# Patient Record
Sex: FEMALE | Race: WHITE | NOT HISPANIC OR LATINO | Employment: OTHER | URBAN - NONMETROPOLITAN AREA
[De-identification: names, ages, dates, MRNs, and addresses within clinical notes are randomized per-mention and may not be internally consistent; named-entity substitution may affect disease eponyms.]

---

## 2019-06-11 ENCOUNTER — HISTORICAL (OUTPATIENT)
Dept: ADMINISTRATIVE | Facility: HOSPITAL | Age: 44
End: 2019-06-11

## 2020-01-08 LAB
BILIRUB SERPL-MCNC: NEGATIVE MG/DL
BLOOD URINE, POC: NEGATIVE
CLARITY, POC UA: NORMAL
COLOR, POC UA: NORMAL
GLUCOSE UR QL STRIP: NEGATIVE
KETONES UR QL STRIP: NEGATIVE
LEUKOCYTE EST, POC UA: NORMAL
NITRITE, POC UA: POSITIVE
PH, POC UA: 5.5
PROTEIN, POC: NEGATIVE
SPECIFIC GRAVITY, POC UA: NORMAL
UROBILINOGEN, POC UA: NORMAL

## 2020-02-14 ENCOUNTER — HISTORICAL (OUTPATIENT)
Dept: ADMINISTRATIVE | Facility: HOSPITAL | Age: 45
End: 2020-02-14

## 2020-02-15 ENCOUNTER — HISTORICAL (OUTPATIENT)
Dept: ADMINISTRATIVE | Facility: HOSPITAL | Age: 45
End: 2020-02-15

## 2020-03-04 ENCOUNTER — HISTORICAL (OUTPATIENT)
Dept: ADMINISTRATIVE | Facility: HOSPITAL | Age: 45
End: 2020-03-04

## 2020-11-19 LAB
BILIRUB SERPL-MCNC: NEGATIVE MG/DL
BLOOD URINE, POC: NEGATIVE
CLARITY, POC UA: NORMAL
COLOR, POC UA: YELLOW
GLUCOSE UR QL STRIP: NEGATIVE
KETONES UR QL STRIP: NEGATIVE
LEUKOCYTE EST, POC UA: NORMAL
NITRITE, POC UA: NEGATIVE
PH, POC UA: 7
PROTEIN, POC: NEGATIVE
SPECIFIC GRAVITY, POC UA: 1.02
UROBILINOGEN, POC UA: NORMAL

## 2021-04-28 LAB
BILIRUB SERPL-MCNC: NEGATIVE MG/DL
BLOOD URINE, POC: NEGATIVE
CLARITY, POC UA: CLEAR
COLOR, POC UA: YELLOW
GLUCOSE UR QL STRIP: NEGATIVE
KETONES UR QL STRIP: NEGATIVE
LEUKOCYTE EST, POC UA: NORMAL
NITRITE, POC UA: NEGATIVE
PH, POC UA: 6.5
PROTEIN, POC: NORMAL
SPECIFIC GRAVITY, POC UA: NORMAL
UROBILINOGEN, POC UA: NORMAL

## 2022-04-11 ENCOUNTER — HISTORICAL (OUTPATIENT)
Dept: ADMINISTRATIVE | Facility: HOSPITAL | Age: 47
End: 2022-04-11
Payer: MEDICAID

## 2022-04-24 VITALS
SYSTOLIC BLOOD PRESSURE: 91 MMHG | WEIGHT: 99.19 LBS | HEIGHT: 63 IN | BODY MASS INDEX: 17.57 KG/M2 | DIASTOLIC BLOOD PRESSURE: 66 MMHG

## 2022-05-13 ENCOUNTER — HISTORICAL (OUTPATIENT)
Dept: ADMINISTRATIVE | Facility: HOSPITAL | Age: 47
End: 2022-05-13

## 2022-05-25 ENCOUNTER — HISTORICAL (OUTPATIENT)
Dept: ADMINISTRATIVE | Facility: HOSPITAL | Age: 47
End: 2022-05-25

## 2022-06-04 ENCOUNTER — HISTORICAL (OUTPATIENT)
Dept: ADMINISTRATIVE | Facility: HOSPITAL | Age: 47
End: 2022-06-04

## 2022-06-05 ENCOUNTER — HISTORICAL (OUTPATIENT)
Dept: ADMINISTRATIVE | Facility: HOSPITAL | Age: 47
End: 2022-06-05

## 2022-06-06 ENCOUNTER — HISTORICAL (OUTPATIENT)
Dept: ADMINISTRATIVE | Facility: HOSPITAL | Age: 47
End: 2022-06-06

## 2022-06-08 ENCOUNTER — HISTORICAL (OUTPATIENT)
Dept: ADMINISTRATIVE | Facility: HOSPITAL | Age: 47
End: 2022-06-08

## 2022-06-10 ENCOUNTER — EXTERNAL HOSPITAL ADMISSION (OUTPATIENT)
Dept: ADMINISTRATIVE | Facility: CLINIC | Age: 47
End: 2022-06-10
Payer: MEDICAID

## 2022-06-10 ENCOUNTER — PATIENT OUTREACH (OUTPATIENT)
Dept: ADMINISTRATIVE | Facility: CLINIC | Age: 47
End: 2022-06-10
Payer: MEDICAID

## 2022-06-10 NOTE — PROGRESS NOTES
C3 nurse attempted to contact Tia Avalos   for a TCC post hospital discharge follow up call. No answer. Left voicemail with callback information. The patient does not have a scheduled HOSFU appointment. Non [facility] PCP.

## 2022-09-21 ENCOUNTER — HISTORICAL (OUTPATIENT)
Dept: ADMINISTRATIVE | Facility: HOSPITAL | Age: 47
End: 2022-09-21
Payer: MEDICARE

## 2023-01-30 ENCOUNTER — TELEPHONE (OUTPATIENT)
Dept: OBSTETRICS AND GYNECOLOGY | Facility: CLINIC | Age: 48
End: 2023-01-30
Payer: MEDICARE

## 2023-01-30 DIAGNOSIS — N39.0 URINARY TRACT INFECTION WITHOUT HEMATURIA, SITE UNSPECIFIED: Primary | ICD-10-CM

## 2023-01-30 RX ORDER — NITROFURANTOIN 25; 75 MG/1; MG/1
100 CAPSULE ORAL 2 TIMES DAILY
Qty: 14 CAPSULE | Refills: 0 | Status: SHIPPED | OUTPATIENT
Start: 2023-01-30 | End: 2023-02-06

## 2023-03-14 ENCOUNTER — HOSPITAL ENCOUNTER (OUTPATIENT)
Dept: RADIOLOGY | Facility: HOSPITAL | Age: 48
Discharge: HOME | End: 2023-03-14
Attending: NURSE PRACTITIONER
Payer: MEDICARE

## 2023-03-14 VITALS — HEIGHT: 63 IN | WEIGHT: 100 LBS | BODY MASS INDEX: 17.72 KG/M2

## 2023-03-14 DIAGNOSIS — Z12.31 BREAST CANCER SCREENING BY MAMMOGRAM: ICD-10-CM

## 2023-03-14 DIAGNOSIS — F17.200 TOBACCO USE DISORDER: ICD-10-CM

## 2023-03-14 PROCEDURE — 77067 SCR MAMMO BI INCL CAD: CPT | Mod: TC

## 2023-03-14 PROCEDURE — 71046 X-RAY EXAM CHEST 2 VIEWS: CPT | Mod: TC

## 2023-03-16 ENCOUNTER — TELEPHONE (OUTPATIENT)
Dept: OBSTETRICS AND GYNECOLOGY | Facility: CLINIC | Age: 48
End: 2023-03-16
Payer: MEDICARE

## 2023-03-16 DIAGNOSIS — N39.41 URGE INCONTINENCE: Primary | ICD-10-CM

## 2023-03-16 RX ORDER — SOLIFENACIN SUCCINATE 10 MG/1
10 TABLET, FILM COATED ORAL DAILY
Qty: 30 TABLET | Refills: 11 | Status: SHIPPED | OUTPATIENT
Start: 2023-03-16 | End: 2023-08-02 | Stop reason: SDUPTHER

## 2023-03-16 NOTE — TELEPHONE ENCOUNTER
"----- Message from Kelly Bull sent at 3/15/2023  2:56 PM CDT -----  Regarding: Refill  Type:  RX Refill Request    Who Called: Mackenzie Amezcua (Care giver)  Refill or New Rx:Refill  RX Name and Strength:"Tovi" for bladder  How is the patient currently taking it? (ex. 1XDay):  Is this a 30 day or 90 day RX:  Preferred Pharmacy with phone number:[facility] in [place]  Local or Mail Order:  Ordering Provider:ASHLEY  Would the patient rather a call back or a response via [facility]?   Best Call Back Number:[phone]  Additional Information: States she needs a PA.        "

## 2023-03-16 NOTE — TELEPHONE ENCOUNTER
Contacted pt's caregiver, Mackenzie, and notifeid ehr Mrs. Ha would like her to try Vesicare 10mg. Pts caregiver states pt preferred pharmacy is [facility] in [place]. Notified her if pt has any problems with medication to give office a call and that medication was being sent in at this moment. Pts caregiver verbalized understanding.

## 2023-07-03 ENCOUNTER — HOSPITAL ENCOUNTER (EMERGENCY)
Facility: HOSPITAL | Age: 48
Discharge: HOME | End: 2023-07-03
Attending: FAMILY MEDICINE
Payer: MEDICARE

## 2023-07-03 VITALS
HEART RATE: 87 BPM | HEIGHT: 63 IN | DIASTOLIC BLOOD PRESSURE: 77 MMHG | BODY MASS INDEX: 17.72 KG/M2 | WEIGHT: 100 LBS | RESPIRATION RATE: 20 BRPM | TEMPERATURE: 97 F | OXYGEN SATURATION: 97 % | SYSTOLIC BLOOD PRESSURE: 104 MMHG

## 2023-07-03 DIAGNOSIS — N39.0 URINARY TRACT INFECTION WITH HEMATURIA, SITE UNSPECIFIED: Primary | ICD-10-CM

## 2023-07-03 DIAGNOSIS — R31.9 URINARY TRACT INFECTION WITH HEMATURIA, SITE UNSPECIFIED: Primary | ICD-10-CM

## 2023-07-03 LAB
APPEARANCE UR: ABNORMAL
BACTERIA #/AREA URNS AUTO: ABNORMAL /HPF
BILIRUB UR QL STRIP.AUTO: NEGATIVE MG/DL
COLOR UR: YELLOW
GLUCOSE UR QL STRIP.AUTO: NEGATIVE MG/DL
KETONES UR QL STRIP.AUTO: NEGATIVE MG/DL
LEUKOCYTE ESTERASE UR QL STRIP.AUTO: NEGATIVE UNIT/L
NITRITE UR QL STRIP.AUTO: POSITIVE
PH UR STRIP.AUTO: 6 [PH]
PROT UR QL STRIP.AUTO: ABNORMAL MG/DL
RBC #/AREA URNS AUTO: ABNORMAL /HPF
RBC UR QL AUTO: NEGATIVE UNIT/L
SP GR UR STRIP.AUTO: >=1.03
SQUAMOUS #/AREA URNS AUTO: ABNORMAL /HPF
UROBILINOGEN UR STRIP-ACNC: 0.2 MG/DL
WBC #/AREA URNS AUTO: ABNORMAL /HPF

## 2023-07-03 PROCEDURE — 87088 URINE BACTERIA CULTURE: CPT | Performed by: NURSE PRACTITIONER

## 2023-07-03 PROCEDURE — 87077 CULTURE AEROBIC IDENTIFY: CPT | Performed by: NURSE PRACTITIONER

## 2023-07-03 PROCEDURE — 81001 URINALYSIS AUTO W/SCOPE: CPT | Performed by: NURSE PRACTITIONER

## 2023-07-03 PROCEDURE — 99284 EMERGENCY DEPT VISIT MOD MDM: CPT

## 2023-07-03 RX ORDER — ONDANSETRON 4 MG/1
4 TABLET, ORALLY DISINTEGRATING ORAL EVERY 6 HOURS PRN
Qty: 12 TABLET | Refills: 0 | Status: SHIPPED | OUTPATIENT
Start: 2023-07-03 | End: 2023-07-03 | Stop reason: SDUPTHER

## 2023-07-03 RX ORDER — NITROFURANTOIN 25; 75 MG/1; MG/1
100 CAPSULE ORAL 2 TIMES DAILY
Qty: 10 CAPSULE | Refills: 0 | Status: SHIPPED | OUTPATIENT
Start: 2023-07-03 | End: 2023-07-08

## 2023-07-03 RX ORDER — ONDANSETRON 4 MG/1
4 TABLET, ORALLY DISINTEGRATING ORAL EVERY 6 HOURS PRN
Qty: 12 TABLET | Refills: 0 | Status: SHIPPED | OUTPATIENT
Start: 2023-07-03

## 2023-07-03 RX ORDER — PHENAZOPYRIDINE HYDROCHLORIDE 200 MG/1
200 TABLET, FILM COATED ORAL 3 TIMES DAILY
Qty: 6 TABLET | Refills: 0 | Status: SHIPPED | OUTPATIENT
Start: 2023-07-03

## 2023-07-03 NOTE — ED PROVIDER NOTES
Encounter Date: 7/3/2023       History     Chief Complaint   Patient presents with    Urinary Frequency     Pt states UTI for a month.  Reports burning upon urination and frequency.       Foul odor and frequency with urination x 1 month  Did not have transportation to come to get treated    The history is provided by the patient and a caregiver. No  was used.   Review of patient's allergies indicates:   Allergen Reactions    Sulfamethoxazole-trimethoprim Rash     No past medical history on file.  No past surgical history on file.  No family history on file.     Review of Systems   Genitourinary:  Positive for dysuria, frequency and urgency.   All other systems reviewed and are negative.    Physical Exam     Initial Vitals [07/03/23 1218]   BP Pulse Resp Temp SpO2   104/74 94 18 97 °F (36.1 °C) 97 %      MAP       --         Physical Exam    Nursing note and vitals reviewed.  Constitutional:   Thin, Cerebral palsy contractures  Pleasant     HENT:   Head: Normocephalic.   Eyes: EOM are normal. Pupils are equal, round, and reactive to light.   Cardiovascular:  Normal rate.           Pulmonary/Chest: No respiratory distress.   Abdominal: Abdomen is soft. Bowel sounds are normal. She exhibits no distension. There is no abdominal tenderness.   Musculoskeletal:      Comments: Walks with assistance unsteady gait     Neurological: She is alert. GCS score is 15. GCS eye subscore is 4. GCS verbal subscore is 5. GCS motor subscore is 6.   Skin: Skin is warm and dry. Capillary refill takes less than 2 seconds. No rash noted.   Psychiatric: She has a normal mood and affect.   Thought content mental/intellectual disability-CP       ED Course   Procedures  Labs Reviewed   URINALYSIS, REFLEX TO URINE CULTURE - Abnormal; Notable for the following components:       Result Value    Appearance, UA Cloudy (*)     Protein, UA Trace (*)     Nitrites, UA Positive (*)     All other components within normal limits     Narrative:      URINE STABILITY IS 2 HOURS AT ROOM TEMP OR    SIX HOURS REFRIGERATED. PERFORMING TESTING ON    SPECIMENS GREATER THAN THIS AGE MAY AFFECT THE    FOLLOWING TESTS:    PH          SPECIFIC GRAVITY           BLOOD    CLARITY     BILIRUBIN               UROBILINOGEN   URINALYSIS, MICROSCOPIC - Abnormal; Notable for the following components:    Bacteria, UA 4+ (*)     Squamous Epithelial Cells, UA Moderate (*)     All other components within normal limits   CULTURE, URINE          Imaging Results    None          Medications - No data to display  Medical Decision Making:   Initial Assessment:   UTI  Differential Diagnosis:   Cystitis, PID  Clinical Tests:   Lab Tests: Ordered and Reviewed       <> Summary of Lab: +nitrite UA=UTI  ED Management:  Discussed home care and treatment.    Increase water intake, patient does not like to drink water    Medications:   Macrobid  Pyridium    Continue home medications as directed.    Follow up with primary care provider for repeat urine after completed abx.                          Clinical Impression:   Final diagnoses:  [N39.0, R31.9] Urinary tract infection with hematuria, site unspecified (Primary)        ED Disposition Condition    Discharge Stable          ED Prescriptions       Medication Sig Dispense Start Date End Date Auth. Provider    nitrofurantoin, macrocrystal-monohydrate, (MACROBID) 100 MG capsule Take 1 capsule (100 mg total) by mouth 2 (two) times daily. for 5 days 10 capsule 7/3/2023 7/8/2023 OSEI Damian    phenazopyridine (PYRIDIUM) 200 MG tablet Take 1 tablet (200 mg total) by mouth 3 (three) times daily. 6 tablet 7/3/2023 -- OSEI Damian    ondansetron (ZOFRAN-ODT) 4 MG TbDL  (Status: Discontinued) Take 1 tablet (4 mg total) by mouth every 6 (six) hours as needed (nausea). 12 tablet 7/3/2023 7/3/2023 OSEI Damian    ondansetron (ZOFRAN-ODT) 4 MG TbDL Take 1 tablet (4 mg total) by mouth every 6 (six) hours as  needed (nausea). 12 tablet 7/3/2023 -- OSEI Damian          Follow-up Information       Follow up With Specialties Details Why Contact Info    Kishore Alejandro NP Family Medicine Schedule an appointment as soon as possible for a visit  After completion of abx for repeat urine [address] [id number]  [phone]               OSEI Damian  07/03/23 2461

## 2023-07-05 LAB — BACTERIA UR CULT: ABNORMAL

## 2023-08-02 DIAGNOSIS — N39.41 URGE INCONTINENCE: ICD-10-CM

## 2023-08-02 RX ORDER — SOLIFENACIN SUCCINATE 10 MG/1
10 TABLET, FILM COATED ORAL DAILY
Qty: 30 TABLET | Refills: 11 | Status: SHIPPED | OUTPATIENT
Start: 2023-08-02 | End: 2024-08-01

## 2023-12-27 DIAGNOSIS — Z79.890 HORMONE REPLACEMENT THERAPY: Primary | ICD-10-CM

## 2023-12-27 RX ORDER — ESTRADIOL 2 MG/1
2 TABLET ORAL DAILY
Qty: 30 TABLET | Refills: 8 | Status: SHIPPED | OUTPATIENT
Start: 2023-12-27

## 2024-01-09 ENCOUNTER — HOSPITAL ENCOUNTER (EMERGENCY)
Facility: HOSPITAL | Age: 49
Discharge: HOME | End: 2024-01-09
Payer: MEDICARE

## 2024-01-09 VITALS
DIASTOLIC BLOOD PRESSURE: 92 MMHG | SYSTOLIC BLOOD PRESSURE: 119 MMHG | TEMPERATURE: 98 F | OXYGEN SATURATION: 98 % | RESPIRATION RATE: 20 BRPM | HEART RATE: 122 BPM

## 2024-01-09 DIAGNOSIS — U07.1 SARS-COV-2 POSITIVE: Primary | ICD-10-CM

## 2024-01-09 LAB
INFLUENZA A (OHS): NEGATIVE
INFLUENZA B (OHS): NEGATIVE
SARS-COV-2 RDRP RESP QL NAA+PROBE: POSITIVE

## 2024-01-09 PROCEDURE — 99284 EMERGENCY DEPT VISIT MOD MDM: CPT

## 2024-01-09 PROCEDURE — 87400 INFLUENZA A/B EACH AG IA: CPT | Performed by: NURSE PRACTITIONER

## 2024-01-09 PROCEDURE — 87635 SARS-COV-2 COVID-19 AMP PRB: CPT | Performed by: NURSE PRACTITIONER

## 2024-01-09 RX ORDER — BENZONATATE 200 MG/1
200 CAPSULE ORAL 3 TIMES DAILY PRN
Qty: 21 CAPSULE | Refills: 0 | Status: SHIPPED | OUTPATIENT
Start: 2024-01-09 | End: 2024-01-19

## 2024-01-09 RX ORDER — NIRMATRELVIR AND RITONAVIR 300-100 MG
KIT ORAL
Qty: 30 TABLET | Refills: 0 | Status: SHIPPED | OUTPATIENT
Start: 2024-01-09 | End: 2024-01-14

## 2024-01-09 NOTE — ED PROVIDER NOTES
"Encounter Date: 1/9/2024       History     Chief Complaint   Patient presents with    Cough     Reports generalized body aches and cough x 5 days w/ intermittent fever. States "I feel better as far as the body aches." Denies taking OTC medications today. PT from home.     Fever     C/O generalized body aches and cough x 2 days w/ intermittent fever. States "I feel better as far as the body aches." Denies taking OTC medications today. PT from home.      Review of patient's allergies indicates:   Allergen Reactions    Sulfamethoxazole-trimethoprim Rash     Past Medical History:   Diagnosis Date    Cerebral palsy, unspecified      History reviewed. No pertinent surgical history.  History reviewed. No pertinent family history.  Social History     Tobacco Use    Smoking status: Every Day     Current packs/day: 0.50     Types: Cigarettes    Smokeless tobacco: Never   Substance Use Topics    Alcohol use: Not Currently     Review of Systems   Constitutional:         Body aches   Respiratory:  Positive for cough.    Neurological:  Positive for headaches.   All other systems reviewed and are negative.      Physical Exam     Initial Vitals [01/09/24 1706]   BP Pulse Resp Temp SpO2   (!) 119/92 (!) 122 20 97.8 °F (36.6 °C) 98 %      MAP       --         Physical Exam    Nursing note and vitals reviewed.  Constitutional: She appears well-developed and well-nourished.   HENT:   Head: Normocephalic and atraumatic.   Eyes: Pupils are equal, round, and reactive to light.   Neck:   Normal range of motion.  Cardiovascular:  Normal rate, regular rhythm and normal heart sounds.           Pulmonary/Chest: Breath sounds normal.   Musculoskeletal:         General: Normal range of motion.      Cervical back: Normal range of motion.     Neurological: She is alert and oriented to person, place, and time.   Skin: Skin is warm and dry. Capillary refill takes less than 2 seconds.   Psychiatric: She has a normal mood and affect. Her behavior is " normal. Judgment and thought content normal.       ED Course   Procedures  Labs Reviewed   SARS-COV-2 RNA AMPLIFICATION, QUAL - Abnormal; Notable for the following components:       Result Value    SARS COV-2 MOLECULAR Positive (*)     All other components within normal limits   RAPID INFLUENZA A/B - Normal          Imaging Results    None          Medications - No data to display  Medical Decision Making  Problems Addressed:  SARS-CoV-2 positive: self-limited or minor problem    Risk  Prescription drug management.                                      Clinical Impression:  Final diagnoses:  [U07.1] SARS-CoV-2 positive (Primary)          ED Disposition Condition    Discharge Stable          ED Prescriptions       Medication Sig Dispense Start Date End Date Auth. Provider    benzonatate (TESSALON) 200 MG capsule Take 1 capsule (200 mg total) by mouth 3 (three) times daily as needed for Cough. 21 capsule 1/9/2024 1/19/2024 Kalyn Rodriguez FNP    nirmatrelvir-ritonavir (PAXLOVID) 300 mg (150 mg x 2)-100 mg copackaged tablets (EUA) Take 3 tablets by mouth 2 (two) times daily. Each dose contains 2 nirmatrelvir (pink tablets) and 1 ritonavir (white tablet). Take all 3 tablets together 30 tablet 1/9/2024 1/14/2024 Kalyn Rodriguez FNP          Follow-up Information       Follow up With Specialties Details Why Contact Info    Kishore Alejandro NP Family Medicine Call  As needed [address] [address]  [phone]               Kalyn Rodriguez FNP  01/09/24 9815

## 2024-04-01 ENCOUNTER — TELEPHONE (OUTPATIENT)
Dept: OBSTETRICS AND GYNECOLOGY | Facility: CLINIC | Age: 49
End: 2024-04-01
Payer: MEDICARE

## 2024-04-01 NOTE — TELEPHONE ENCOUNTER
----- Message from Kelly Bull sent at 4/1/2024  4:10 PM CDT -----  Regarding: Call Back  Type:  Patient Returning Call    Who Called:  Who Left Message for Patient:  Does the patient know what this is regarding?:  Would the patient rather a call back or a response via [facility]?   Best Call Back Number:[phone]  Additional Information: Pt has not been seen since 8/2022. Asking for refills on Hormone medication.   [facility]

## 2024-04-01 NOTE — TELEPHONE ENCOUNTER
Contacted pt about medication. Per  8 refills were sent to [facility] pharmacy 12/2023. Advised pt that she should still have refills available. Pt verbalized understanding.

## 2024-04-25 ENCOUNTER — LAB REQUISITION (OUTPATIENT)
Dept: LAB | Facility: HOSPITAL | Age: 49
End: 2024-04-25
Payer: MEDICARE

## 2024-04-25 DIAGNOSIS — E78.5 HYPERLIPIDEMIA, UNSPECIFIED: ICD-10-CM

## 2024-04-25 DIAGNOSIS — J44.9 CHRONIC OBSTRUCTIVE PULMONARY DISEASE, UNSPECIFIED: ICD-10-CM

## 2024-04-25 DIAGNOSIS — M79.641 PAIN IN RIGHT HAND: ICD-10-CM

## 2024-04-25 DIAGNOSIS — G80.8 OTHER CEREBRAL PALSY: ICD-10-CM

## 2024-04-25 DIAGNOSIS — M79.642 PAIN IN LEFT HAND: ICD-10-CM

## 2024-04-25 DIAGNOSIS — M79.605 PAIN IN LEFT LEG: ICD-10-CM

## 2024-04-25 DIAGNOSIS — M79.604 PAIN IN RIGHT LEG: ICD-10-CM

## 2024-04-25 DIAGNOSIS — F32.A DEPRESSION, UNSPECIFIED: ICD-10-CM

## 2024-04-25 LAB
CHOLEST SERPL-MCNC: 150 MG/DL (ref 0–200)
EST. AVERAGE GLUCOSE BLD GHB EST-MCNC: 99.7 MG/DL (ref 70–115)
HBA1C MFR BLD: 5.1 % (ref 4–6)
HDLC SERPL-MCNC: 46 MG/DL (ref 40–60)
LDLC SERPL DIRECT ASSAY-SCNC: 86.4 MG/DL (ref 30–100)
MICROALBUMIN UR-MCNC: 6.8 UG/ML
T4 FREE SERPL-MCNC: 0.75 NG/DL (ref 0.78–2.19)
TRIGL SERPL-MCNC: 214 MG/DL (ref 30–200)
TSH SERPL-ACNC: 2.43 UIU/ML (ref 0.36–3.74)

## 2024-04-25 PROCEDURE — 84439 ASSAY OF FREE THYROXINE: CPT | Performed by: INTERNAL MEDICINE

## 2024-04-25 PROCEDURE — 83036 HEMOGLOBIN GLYCOSYLATED A1C: CPT | Performed by: INTERNAL MEDICINE

## 2024-04-25 PROCEDURE — 84443 ASSAY THYROID STIM HORMONE: CPT | Performed by: INTERNAL MEDICINE

## 2024-04-25 PROCEDURE — 82043 UR ALBUMIN QUANTITATIVE: CPT | Performed by: INTERNAL MEDICINE

## 2024-04-25 PROCEDURE — 80061 LIPID PANEL: CPT | Performed by: INTERNAL MEDICINE

## 2024-06-05 ENCOUNTER — OFFICE VISIT (OUTPATIENT)
Dept: OBSTETRICS AND GYNECOLOGY | Facility: CLINIC | Age: 49
End: 2024-06-05
Payer: MEDICARE

## 2024-06-05 VITALS
SYSTOLIC BLOOD PRESSURE: 120 MMHG | DIASTOLIC BLOOD PRESSURE: 72 MMHG | HEIGHT: 63 IN | WEIGHT: 108 LBS | BODY MASS INDEX: 19.14 KG/M2 | TEMPERATURE: 98 F

## 2024-06-05 DIAGNOSIS — N30.00 ACUTE CYSTITIS WITHOUT HEMATURIA: ICD-10-CM

## 2024-06-05 DIAGNOSIS — G80.9 CEREBRAL PALSY, UNSPECIFIED TYPE: ICD-10-CM

## 2024-06-05 DIAGNOSIS — R30.0 DYSURIA: Primary | ICD-10-CM

## 2024-06-05 DIAGNOSIS — Z12.31 BREAST CANCER SCREENING BY MAMMOGRAM: ICD-10-CM

## 2024-06-05 LAB
BILIRUB UR QL STRIP: NEGATIVE
GLUCOSE UR QL STRIP: NEGATIVE
KETONES UR QL STRIP: NEGATIVE
LEUKOCYTE ESTERASE UR QL STRIP: POSITIVE
PH, POC UA: 6
POC BLOOD, URINE: NEGATIVE
POC NITRATES, URINE: POSITIVE
PROT UR QL STRIP: NEGATIVE
SP GR UR STRIP: 1.03 (ref 1–1.03)
UROBILINOGEN UR STRIP-ACNC: 0.2 (ref 0.1–1.1)

## 2024-06-05 PROCEDURE — 1160F RVW MEDS BY RX/DR IN RCRD: CPT | Mod: ,,, | Performed by: NURSE PRACTITIONER

## 2024-06-05 PROCEDURE — 99213 OFFICE O/P EST LOW 20 MIN: CPT | Mod: ,,, | Performed by: NURSE PRACTITIONER

## 2024-06-05 PROCEDURE — 1159F MED LIST DOCD IN RCRD: CPT | Mod: ,,, | Performed by: NURSE PRACTITIONER

## 2024-06-05 PROCEDURE — 3074F SYST BP LT 130 MM HG: CPT | Mod: ,,, | Performed by: NURSE PRACTITIONER

## 2024-06-05 PROCEDURE — 3008F BODY MASS INDEX DOCD: CPT | Mod: ,,, | Performed by: NURSE PRACTITIONER

## 2024-06-05 PROCEDURE — 3078F DIAST BP <80 MM HG: CPT | Mod: ,,, | Performed by: NURSE PRACTITIONER

## 2024-06-05 PROCEDURE — 81003 URINALYSIS AUTO W/O SCOPE: CPT | Mod: QW,RHCUB | Performed by: NURSE PRACTITIONER

## 2024-06-05 PROCEDURE — 3044F HG A1C LEVEL LT 7.0%: CPT | Mod: ,,, | Performed by: NURSE PRACTITIONER

## 2024-06-05 RX ORDER — LOVASTATIN 10 MG/1
10 TABLET ORAL
COMMUNITY
Start: 2024-05-31

## 2024-06-05 RX ORDER — QUETIAPINE FUMARATE 400 MG/1
400 TABLET, FILM COATED ORAL NIGHTLY
COMMUNITY
Start: 2024-05-31

## 2024-06-05 RX ORDER — METOPROLOL SUCCINATE 50 MG/1
50 TABLET, EXTENDED RELEASE ORAL NIGHTLY
COMMUNITY
Start: 2024-04-22

## 2024-06-05 RX ORDER — IBUPROFEN 800 MG/1
800 TABLET ORAL
COMMUNITY
Start: 2024-01-10

## 2024-06-05 RX ORDER — CARIPRAZINE 3 MG/1
3 CAPSULE, GELATIN COATED ORAL
COMMUNITY
Start: 2024-06-03

## 2024-06-05 RX ORDER — VILAZODONE HYDROCHLORIDE 20 MG/1
1 TABLET ORAL EVERY MORNING
COMMUNITY
Start: 2024-01-16

## 2024-06-05 RX ORDER — ALBUTEROL SULFATE 90 UG/1
AEROSOL, METERED RESPIRATORY (INHALATION)
COMMUNITY
Start: 2024-01-09

## 2024-06-05 RX ORDER — OMEPRAZOLE 40 MG/1
40 CAPSULE, DELAYED RELEASE ORAL
COMMUNITY
Start: 2024-05-31

## 2024-06-05 RX ORDER — GABAPENTIN 600 MG/1
600 TABLET ORAL
COMMUNITY
Start: 2023-12-26

## 2024-06-05 RX ORDER — NITROFURANTOIN 25; 75 MG/1; MG/1
100 CAPSULE ORAL 2 TIMES DAILY
Qty: 14 CAPSULE | Refills: 0 | Status: SHIPPED | OUTPATIENT
Start: 2024-06-05 | End: 2024-06-12

## 2024-06-05 RX ORDER — HYDROXYZINE HYDROCHLORIDE 50 MG/1
100 TABLET, FILM COATED ORAL 3 TIMES DAILY
COMMUNITY
Start: 2024-04-25

## 2024-06-05 RX ORDER — POTASSIUM CHLORIDE 750 MG/1
10 TABLET, EXTENDED RELEASE ORAL
COMMUNITY
Start: 2024-05-31

## 2024-06-05 RX ORDER — BACLOFEN 10 MG/1
10 TABLET ORAL 3 TIMES DAILY
COMMUNITY
Start: 2024-05-31

## 2024-06-05 NOTE — PROGRESS NOTES
Chief Complaint:     Chief Complaint   Patient presents with    Well Woman         HPI:   49 y.o.  female   presents with c/o burning with urination x 2 days.  Reports treated for UTI 2 months ago.  States has recurrent UTIs, requesting referral to urologist.  S/p FINN, BSO, currently on 2 mg.  Urinary urgency controlled with Vesicare.  Due for mammogram.  Hx CP, in wheelchair.    Gyn History:    Menstrual History   Cycle: No  Menarche Age: 2 years  No Cycle Reason: (!) Surgical  Surgical Reason: hysterectomy  [place]  Sexually Active: Yes  Sexual Orientation: heterosexual  Postcoital Bleeding: No  Dyspareunia: No  STI History: Yes  STI Type: Trichomonas  Contraception: No  Menopause  Menopause Age: 2 years  Hormone Replacement Therapy: Yes (Estradiol)  Breast History  Last Breast Imaging Date: Yes  Date: 24  History of Abnormal Breast Imaging : No  History of Breast Biopsy: No  Pap History   History of Abnormal Pap: No  HPV Vaccine Completed: No (0/3)          Current Outpatient Medications:     albuterol (PROVENTIL/VENTOLIN HFA) 90 mcg/actuation inhaler, SMARTSI Puff(s) By Mouth 4 Times Daily, Disp: , Rfl:     baclofen (LIORESAL) 10 MG tablet, Take 10 mg by mouth 3 (three) times daily., Disp: , Rfl:     estradioL (ESTRACE) 2 MG tablet, Take 1 tablet (2 mg total) by mouth once daily., Disp: 30 tablet, Rfl: 8    gabapentin (NEURONTIN) 600 MG tablet, Take 600 mg by mouth., Disp: , Rfl:     hydrOXYzine (ATARAX) 50 MG tablet, Take 100 mg by mouth 3 (three) times daily., Disp: , Rfl:     ibuprofen (ADVIL,MOTRIN) 800 MG tablet, Take 800 mg by mouth., Disp: , Rfl:     lovastatin (MEVACOR) 10 MG tablet, Take 10 mg by mouth., Disp: , Rfl:     metoprolol succinate (TOPROL-XL) 50 MG 24 hr tablet, Take 50 mg by mouth every evening., Disp: , Rfl:     omeprazole (PRILOSEC) 40 MG capsule, Take 40 mg by mouth., Disp: , Rfl:     ondansetron (ZOFRAN-ODT) 4 MG TbDL, Take 1 tablet (4 mg total) by mouth every 6 (six)  hours as needed (nausea)., Disp: 12 tablet, Rfl: 0    phenazopyridine (PYRIDIUM) 200 MG tablet, Take 1 tablet (200 mg total) by mouth 3 (three) times daily., Disp: 6 tablet, Rfl: 0    potassium chloride (KLOR-CON) 10 MEQ TbSR, Take 10 mEq by mouth., Disp: , Rfl:     QUEtiapine (SEROQUEL) 400 MG tablet, Take 400 mg by mouth every evening., Disp: , Rfl:     solifenacin (VESICARE) 10 MG tablet, Take 1 tablet (10 mg total) by mouth once daily., Disp: 30 tablet, Rfl: 11    vilazodone (VIIBRYD) 20 mg Tab, Take 1 tablet by mouth every morning., Disp: , Rfl:     VRAYLAR 3 mg Cap, Take 3 mg by mouth., Disp: , Rfl:     Review of patient's allergies indicates:   Allergen Reactions    Sulfamethoxazole-trimethoprim Rash       Social History     Tobacco Use    Smoking status: Every Day     Current packs/day: 0.50     Types: Cigarettes    Smokeless tobacco: Never   Substance Use Topics    Alcohol use: Not Currently     Comment: rarely    Drug use: Yes     Frequency: 7.0 times per week     Types: Marijuana       Review of Systems:   General/Constitutional: Chills denies. Fatigue/weakness denies. Fever denies. Night sweats denies. Hot flashes denies    Respiratory: Cough denies. Hemoptysis denies. SOB denies. Sputum production denies. Wheezing denies .   Cardiovascular: Chest pain denies . Dizziness denies. Palpitations denies. Swelling in hands/feet denies    Gastrointestinal: Abdominal pain denies. Blood in stool denies. Constipation denies. Diarrhea denies. Heartburn denies. Nausea denies. Vomiting denies    Genitourinary: Incontinence denies. Blood in urine denies. Frequent urination denies. Painful urination admits. Urinary urgency denies. Nocturia denies    Gynecologic: Irregular menses denies. Heavy bleeding denies. Painful menses denies. Vaginal discharge denies. Vaginal odor denies. Vaginal itching denies. Vaginal lesion denies. Pelvic pain denies. Decreased libido denies. Vulvar lesion denies. Prolapse of genital organs  "denies. Painful intercourse denies. Postcoital bleeding denies    Psychiatric: Depression denies. Anxiety denies       Physical Exam:   Vitals:    06/05/24 1513   BP: 120/72   Temp: 98.2 °F (36.8 °C)   Weight: 49 kg (108 lb)   Height: 5' 3" (1.6 m)       Body mass index is 19.13 kg/m².    Physical Exam  Constitutional:       Appearance: She is well-developed.   Neck:      Thyroid: No thyroid mass or thyroid tenderness.   Cardiovascular:      Rate and Rhythm: Normal rate and regular rhythm.      Pulses: Normal pulses.      Heart sounds: Normal heart sounds. No murmur heard.  Pulmonary:      Effort: No respiratory distress or retractions.      Breath sounds: Normal breath sounds. No decreased breath sounds, wheezing, rhonchi or rales.   Chest:   Breasts:     Right: No inverted nipple, mass, nipple discharge, skin change or tenderness.      Left: No inverted nipple, mass, nipple discharge, skin change or tenderness.   Abdominal:      General: Bowel sounds are normal.      Palpations: There is no mass.      Tenderness: There is no abdominal tenderness.      Hernia: No hernia is present.   Genitourinary:     Vagina: Normal. No vaginal discharge, erythema or tenderness.      Rectum: No tenderness or external hemorrhoid.   Musculoskeletal:      Cervical back: No edema.      Right lower leg: No edema.      Left lower leg: No edema.   Lymphadenopathy:      Head:      Right side of head: No submandibular or preauricular adenopathy.      Left side of head: No submandibular or preauricular adenopathy.      Upper Body:      Right upper body: No supraclavicular or axillary adenopathy.      Left upper body: No supraclavicular or axillary adenopathy.   Skin:     General: Skin is warm and dry.      Coloration: Skin is not pale.      Findings: No erythema or rash.   Neurological:      Mental Status: She is alert and oriented to person, place, and time.   Psychiatric:         Mood and Affect: Mood normal. Mood is not anxious or " depressed.         Behavior: Behavior normal.         Thought Content: Thought content normal. Thought content does not include homicidal or suicidal ideation. Thought content does not include homicidal or suicidal plan.             Assessment:     Patient Active Problem List   Diagnosis    SARS-CoV-2 positive       Health Maintenance Due   Topic Date Due    Hepatitis C Screening  Never done    Pneumococcal Vaccines (Age 0-64) (1 of 2 - PCV) Never done    HIV Screening  Never done    Colorectal Cancer Screening  Never done    COVID-19 Vaccine (1 - 2023-24 season) Never done    Mammogram  03/14/2024     Health Maintenance Topics with due status: Not Due       Topic Last Completion Date    TETANUS VACCINE 11/27/2016    Lipid Panel 04/25/2024    Influenza Vaccine Not Due           Plan:    Tia was seen today for well woman.    Diagnoses and all orders for this visit:    Dysuria  UTI  Educated, UA  Discussed urinalysis results and patients symptoms  Culture sent to lab  Advised to to call if symptoms do not improve within 24 hrs or if she develops fever  Rx: Macrobid  Refer to Dr Lico Pulido's office due to recurrent UTIs    Breast cancer screening by mammogram    Cerebral palsy, unspecified type

## 2024-06-06 ENCOUNTER — HOSPITAL ENCOUNTER (OUTPATIENT)
Dept: RADIOLOGY | Facility: HOSPITAL | Age: 49
Discharge: HOME | End: 2024-06-06
Attending: INTERNAL MEDICINE
Payer: MEDICARE

## 2024-06-06 DIAGNOSIS — M54.9 DORSALGIA: ICD-10-CM

## 2024-06-06 PROCEDURE — 72100 X-RAY EXAM L-S SPINE 2/3 VWS: CPT | Mod: TC

## 2024-06-06 PROCEDURE — 71046 X-RAY EXAM CHEST 2 VIEWS: CPT | Mod: TC

## 2024-06-09 LAB
BACTERIA UR CULT: ABNORMAL
BACTERIA UR CULT: ABNORMAL

## 2024-06-20 ENCOUNTER — TELEPHONE (OUTPATIENT)
Dept: OBSTETRICS AND GYNECOLOGY | Facility: CLINIC | Age: 49
End: 2024-06-20
Payer: MEDICARE

## 2024-06-20 DIAGNOSIS — Z12.31 SCREENING MAMMOGRAM FOR BREAST CANCER: Primary | ICD-10-CM

## 2024-06-20 NOTE — TELEPHONE ENCOUNTER
Contact pt, let her know mmg order is placed, hosp will call to schedule, pt verbalized understanding

## 2024-06-20 NOTE — TELEPHONE ENCOUNTER
----- Message from Brylee Guillory sent at 6/20/2024  4:02 PM CDT -----  Regarding: Order  Contact: Tia  Type:  Mammogram    Caller is requesting to schedule their annual mammogram appointment.  Order is not listed in EPIC.  Please enter order and contact patient to schedule.  Name of Caller:Tia  Where would they like the mammogram performed?  Would the patient rather a call back or a response via [facility]? Call back  Best Call Back Number:[phone]   Additional Information: Pt is requesting a mammogram order to be put in

## 2024-06-21 ENCOUNTER — HOSPITAL ENCOUNTER (EMERGENCY)
Facility: HOSPITAL | Age: 49
Discharge: HOME | End: 2024-06-21
Attending: SURGERY
Payer: MEDICARE

## 2024-06-21 DIAGNOSIS — R07.9 CHEST PAIN: ICD-10-CM

## 2024-06-21 DIAGNOSIS — R07.9 CHEST PAIN, UNSPECIFIED TYPE: Primary | ICD-10-CM

## 2024-06-21 LAB
ALBUMIN SERPL-MCNC: 3.7 G/DL (ref 3.4–5)
ALBUMIN/GLOB SERPL: 1.2 RATIO
ALP SERPL-CCNC: 106 UNIT/L (ref 50–144)
ALT SERPL-CCNC: 21 UNIT/L (ref 1–45)
ANION GAP SERPL CALC-SCNC: 7 MEQ/L (ref 2–13)
AST SERPL-CCNC: 25 UNIT/L (ref 14–36)
BASOPHILS # BLD AUTO: 0.05 X10(3)/MCL (ref 0.01–0.08)
BASOPHILS NFR BLD AUTO: 0.7 % (ref 0.1–1.2)
BILIRUB SERPL-MCNC: 0.3 MG/DL (ref 0–1)
BUN SERPL-MCNC: 10 MG/DL (ref 7–20)
CALCIUM SERPL-MCNC: 9 MG/DL (ref 8.4–10.2)
CHLORIDE SERPL-SCNC: 103 MMOL/L (ref 98–110)
CO2 SERPL-SCNC: 24 MMOL/L (ref 21–32)
CREAT SERPL-MCNC: 0.64 MG/DL (ref 0.66–1.25)
CREAT/UREA NIT SERPL: 16 (ref 12–20)
EOSINOPHIL # BLD AUTO: 0.35 X10(3)/MCL (ref 0.04–0.36)
EOSINOPHIL NFR BLD AUTO: 5.1 % (ref 0.7–7)
ERYTHROCYTE [DISTWIDTH] IN BLOOD BY AUTOMATED COUNT: 11.9 % (ref 11–14.5)
GFR SERPLBLD CREATININE-BSD FMLA CKD-EPI: >90 ML/MIN/1.73/M2
GLOBULIN SER-MCNC: 3 GM/DL (ref 2–3.9)
GLUCOSE SERPL-MCNC: 139 MG/DL (ref 70–115)
HCT VFR BLD AUTO: 39.5 % (ref 36–48)
HGB BLD-MCNC: 13.2 G/DL (ref 11.8–16)
IMM GRANULOCYTES # BLD AUTO: 0.02 X10(3)/MCL (ref 0–0.03)
IMM GRANULOCYTES NFR BLD AUTO: 0.3 % (ref 0–0.5)
LYMPHOCYTES # BLD AUTO: 3.33 X10(3)/MCL (ref 1.16–3.74)
LYMPHOCYTES NFR BLD AUTO: 48.3 % (ref 20–55)
MCH RBC QN AUTO: 28.1 PG (ref 27–34)
MCHC RBC AUTO-ENTMCNC: 33.4 G/DL (ref 31–37)
MCV RBC AUTO: 84.2 FL (ref 79–99)
MONOCYTES # BLD AUTO: 0.64 X10(3)/MCL (ref 0.24–0.36)
MONOCYTES NFR BLD AUTO: 9.3 % (ref 4.7–12.5)
NEUTROPHILS # BLD AUTO: 2.51 X10(3)/MCL (ref 1.56–6.13)
NEUTROPHILS NFR BLD AUTO: 36.3 % (ref 37–73)
NRBC BLD AUTO-RTO: 0 %
PLATELET # BLD AUTO: 276 X10(3)/MCL (ref 140–371)
PMV BLD AUTO: 9.1 FL (ref 9.4–12.4)
POTASSIUM SERPL-SCNC: 3.8 MMOL/L (ref 3.5–5.1)
PROT SERPL-MCNC: 6.7 GM/DL (ref 6.3–8.2)
RBC # BLD AUTO: 4.69 X10(6)/MCL (ref 4–5.1)
SODIUM SERPL-SCNC: 134 MMOL/L (ref 136–145)
TROPONIN I SERPL-MCNC: <0.012 NG/ML (ref 0–0.03)
TROPONIN I SERPL-MCNC: <0.012 NG/ML (ref 0–0.03)
WBC # BLD AUTO: 6.9 X10(3)/MCL (ref 4–11.5)

## 2024-06-21 PROCEDURE — 25000003 PHARM REV CODE 250: Performed by: SURGERY

## 2024-06-21 PROCEDURE — 84484 ASSAY OF TROPONIN QUANT: CPT | Performed by: SURGERY

## 2024-06-21 PROCEDURE — 93005 ELECTROCARDIOGRAM TRACING: CPT

## 2024-06-21 PROCEDURE — 93010 ELECTROCARDIOGRAM REPORT: CPT | Mod: ,,, | Performed by: INTERNAL MEDICINE

## 2024-06-21 PROCEDURE — 85025 COMPLETE CBC W/AUTO DIFF WBC: CPT | Performed by: SURGERY

## 2024-06-21 PROCEDURE — 99285 EMERGENCY DEPT VISIT HI MDM: CPT | Mod: 25

## 2024-06-21 PROCEDURE — 80053 COMPREHEN METABOLIC PANEL: CPT | Performed by: SURGERY

## 2024-06-21 PROCEDURE — 96374 THER/PROPH/DIAG INJ IV PUSH: CPT

## 2024-06-21 RX ORDER — ASPIRIN 325 MG
325 TABLET ORAL ONCE
Status: COMPLETED | OUTPATIENT
Start: 2024-06-21 | End: 2024-06-21

## 2024-06-21 RX ORDER — ALUMINUM HYDROXIDE, MAGNESIUM HYDROXIDE, AND SIMETHICONE 1200; 120; 1200 MG/30ML; MG/30ML; MG/30ML
30 SUSPENSION ORAL ONCE
Status: COMPLETED | OUTPATIENT
Start: 2024-06-21 | End: 2024-06-21

## 2024-06-21 RX ORDER — FAMOTIDINE 10 MG/ML
40 INJECTION INTRAVENOUS ONCE
Status: COMPLETED | OUTPATIENT
Start: 2024-06-21 | End: 2024-06-21

## 2024-06-21 RX ORDER — LIDOCAINE HYDROCHLORIDE 20 MG/ML
15 SOLUTION OROPHARYNGEAL ONCE
Status: COMPLETED | OUTPATIENT
Start: 2024-06-21 | End: 2024-06-21

## 2024-06-21 RX ADMIN — LIDOCAINE HYDROCHLORIDE 15 ML: 20 SOLUTION ORAL at 04:06

## 2024-06-21 RX ADMIN — ALUMINUM HYDROXIDE, MAGNESIUM HYDROXIDE, AND SIMETHICONE 30 ML: 1200; 120; 1200 SUSPENSION ORAL at 04:06

## 2024-06-21 RX ADMIN — FAMOTIDINE 40 MG: 10 INJECTION, SOLUTION INTRAVENOUS at 04:06

## 2024-06-21 RX ADMIN — ASPIRIN 325 MG ORAL TABLET 325 MG: 325 PILL ORAL at 04:06

## 2024-06-21 NOTE — ED PROVIDER NOTES
Encounter Date: 6/21/2024       History     Chief Complaint   Patient presents with    Chest Pain     Pt reports she has been having chest pain since Monday in the center of her chest. Pt has cerebral palsy.      48 YO WF W/ KNOWN Hx/o HLD-HCHOL, TOB ABUSE (DAILY), CP PRESENTING W/ ACUTE ATRAUMATIC ONSET TRANS-PRECORDIAL CHEST PAIN X 2 DAYS PTA.  PAIN IS DESCRIBED AS CONSTANT.  NO PLEURISY.  NO HEMOPTYSIS.  NO SOB.  NO SYNCOPE/NEAR SYNCOPE.  NO AB PAIN.  NO NV.  NO Hx/o HTN/MI/DVT/CVA/PE/TIA.  NO RADIATION.  S/P HYST        Review of patient's allergies indicates:   Allergen Reactions    Sulfamethoxazole-trimethoprim Rash     Past Medical History:   Diagnosis Date    Cerebral palsy, unspecified     GERD (gastroesophageal reflux disease)     Hyperlipidemia      Past Surgical History:   Procedure Laterality Date    ESOPHAGUS SURGERY      FOOT SURGERY Bilateral     TOTAL ABDOMINAL HYSTERECTOMY W/ BILATERAL SALPINGOOPHORECTOMY       Family History   Problem Relation Name Age of Onset    Breast cancer Neg Hx      Colon cancer Neg Hx      Ovarian cancer Neg Hx      Uterine cancer Neg Hx      Cervical cancer Neg Hx       Social History     Tobacco Use    Smoking status: Every Day     Current packs/day: 0.50     Types: Cigarettes    Smokeless tobacco: Never   Substance Use Topics    Alcohol use: Not Currently     Comment: rarely    Drug use: Yes     Frequency: 7.0 times per week     Types: Marijuana     Review of Systems   All other systems reviewed and are negative.      Physical Exam     Initial Vitals [06/21/24 1544]   BP Pulse Resp Temp SpO2   (!) 153/83 83 18 98.3 °F (36.8 °C) 96 %      MAP       --         Physical Exam    Nursing note and vitals reviewed.  Constitutional: She appears well-developed and well-nourished. She is not diaphoretic. No distress.   HENT:   Head: Normocephalic and atraumatic.   Right Ear: External ear normal.   Left Ear: External ear normal.   Nose: Nose normal.   Mouth/Throat: Oropharynx is  clear and moist.   Eyes: Conjunctivae and EOM are normal. Pupils are equal, round, and reactive to light. No scleral icterus.   Neck: Neck supple. No tracheal deviation present. No JVD present.   Normal range of motion.  Cardiovascular:  Normal rate, regular rhythm, normal heart sounds and intact distal pulses.     Exam reveals no gallop.       No murmur heard.  Pulmonary/Chest: Breath sounds normal. No stridor. No respiratory distress. She has no wheezes. She has no rhonchi. She has no rales.   Abdominal: Abdomen is soft. Bowel sounds are normal. She exhibits no distension and no mass. There is no abdominal tenderness. There is no rebound and no guarding.   Musculoskeletal:      Cervical back: Normal range of motion and neck supple.      Comments: +CP W/ EXT CONTRACTURES - CHRONIC       Neurological: She is alert and oriented to person, place, and time. No cranial nerve deficit or sensory deficit. GCS score is 15. GCS eye subscore is 4. GCS verbal subscore is 5. GCS motor subscore is 6.   +AT BASELINE PER PATIENT &        Skin: Skin is warm. Capillary refill takes less than 2 seconds.   Psychiatric: She has a normal mood and affect. Her behavior is normal. Judgment and thought content normal.         ED Course   Procedures  Labs Reviewed   COMPREHENSIVE METABOLIC PANEL - Abnormal; Notable for the following components:       Result Value    Sodium 134 (*)     Glucose 139 (*)     Creatinine 0.64 (*)     All other components within normal limits   CBC WITH DIFFERENTIAL - Abnormal; Notable for the following components:    MPV 9.1 (*)     Neut % 36.3 (*)     Mono # 0.64 (*)     All other components within normal limits   TROPONIN I - Normal   TROPONIN I - Normal   CBC W/ AUTO DIFFERENTIAL    Narrative:     The following orders were created for panel order CBC Auto Differential.  Procedure                               Abnormality         Status                     ---------                                -----------         ------                     CBC with Differential[[id number]]       Abnormal            Final result                 Please view results for these tests on the individual orders.     EKG Readings: (Independently Interpreted)   Initial Reading: No STEMI. Heart Rate: 81. Ectopy: No Ectopy. Conduction: Normal. ST Segments: Normal ST Segments. T Waves: Normal. Axis: Normal.   SINUS FOCUS  NO ECTOPY  NO ISCHEMIC CHANGES  QRS WNL  CO WNL  QTc WNL       Imaging Results              X-Ray Chest AP Portable (In process)                      Medications   aspirin tablet 325 mg (325 mg Oral Given 6/21/24 1600)   aluminum-magnesium hydroxide-simethicone 200-200-20 mg/5 mL suspension 30 mL (30 mLs Oral Given 6/21/24 1655)     And   LIDOcaine viscous HCl 2% oral solution 15 mL (15 mLs Oral Given 6/21/24 1655)   famotidine (PF) injection 40 mg (40 mg Intravenous Given 6/21/24 1654)     Medical Decision Making  Patient came in with chest pain.  Two sets of troponin are negative.  Advised the patient follow up with cardiologist.  Advised to return to emergency room if symptoms worsen.    Differential diagnosis: 1. Atypical chest pain 2. NSTEMI 3. STEMI 4.  Pneumonia 5.  Costochondritis    Amount and/or Complexity of Data Reviewed  Labs: ordered.  Radiology: ordered.    Risk  OTC drugs.  Prescription drug management.                                      Clinical Impression:  Final diagnoses:  [R07.9] Chest pain  [R07.9] Chest pain, unspecified type (Primary)          ED Disposition Condition    Discharge Stable          ED Prescriptions    None       Follow-up Information       Follow up With Specialties Details Why Contact Info    Follow up with cardiologist                 Yaakov Britton DO  06/21/24 4631

## 2024-06-22 VITALS
RESPIRATION RATE: 23 BRPM | SYSTOLIC BLOOD PRESSURE: 101 MMHG | BODY MASS INDEX: 19.14 KG/M2 | HEIGHT: 63 IN | WEIGHT: 108 LBS | TEMPERATURE: 98 F | OXYGEN SATURATION: 98 % | DIASTOLIC BLOOD PRESSURE: 82 MMHG | HEART RATE: 76 BPM

## 2024-06-24 LAB
OHS QRS DURATION: 76 MS
OHS QTC CALCULATION: 439 MS

## 2024-07-26 ENCOUNTER — TELEPHONE (OUTPATIENT)
Dept: OBSTETRICS AND GYNECOLOGY | Facility: CLINIC | Age: 49
End: 2024-07-26
Payer: MEDICARE

## 2024-10-23 ENCOUNTER — LAB VISIT (OUTPATIENT)
Dept: LAB | Facility: HOSPITAL | Age: 49
End: 2024-10-23
Attending: INTERNAL MEDICINE
Payer: MEDICARE

## 2024-10-23 DIAGNOSIS — E11.40 DIABETIC NEUROPATHY: Primary | ICD-10-CM

## 2024-10-23 LAB
ALBUMIN SERPL-MCNC: 4.1 G/DL (ref 3.4–5)
ALBUMIN/GLOB SERPL: 1.7 RATIO
ALP SERPL-CCNC: 86 UNIT/L (ref 50–144)
ALT SERPL-CCNC: 19 UNIT/L (ref 1–45)
ANION GAP SERPL CALC-SCNC: 7 MEQ/L (ref 2–13)
AST SERPL-CCNC: 24 UNIT/L (ref 14–36)
BASOPHILS # BLD AUTO: 0.04 X10(3)/MCL (ref 0.01–0.08)
BASOPHILS NFR BLD AUTO: 0.6 % (ref 0.1–1.2)
BILIRUB SERPL-MCNC: 0.3 MG/DL (ref 0–1)
BILIRUB UR QL STRIP.AUTO: NEGATIVE
BUN SERPL-MCNC: 10 MG/DL (ref 7–20)
CALCIUM SERPL-MCNC: 9.3 MG/DL (ref 8.4–10.2)
CHLORIDE SERPL-SCNC: 105 MMOL/L (ref 98–110)
CHOLEST SERPL-MCNC: 180 MG/DL (ref 0–200)
CLARITY UR: CLEAR
CO2 SERPL-SCNC: 24 MMOL/L (ref 21–32)
COLOR UR AUTO: YELLOW
CREAT SERPL-MCNC: 0.64 MG/DL (ref 0.66–1.25)
CREAT UR-MCNC: 173.4 MG/DL
CREAT/UREA NIT SERPL: 16 (ref 12–20)
EOSINOPHIL # BLD AUTO: 0.23 X10(3)/MCL (ref 0.04–0.36)
EOSINOPHIL NFR BLD AUTO: 3.7 % (ref 0.7–7)
ERYTHROCYTE [DISTWIDTH] IN BLOOD BY AUTOMATED COUNT: 14.4 % (ref 11–14.5)
EST. AVERAGE GLUCOSE BLD GHB EST-MCNC: 88.2 MG/DL (ref 70–115)
GFR SERPLBLD CREATININE-BSD FMLA CKD-EPI: >90 ML/MIN/1.73/M2
GLOBULIN SER-MCNC: 2.4 GM/DL (ref 2–3.9)
GLUCOSE SERPL-MCNC: 93 MG/DL (ref 70–115)
GLUCOSE UR QL STRIP: NEGATIVE
HBA1C MFR BLD: 4.7 % (ref 4–6)
HCT VFR BLD AUTO: 40.2 % (ref 36–48)
HDLC SERPL-MCNC: 61 MG/DL (ref 40–60)
HGB BLD-MCNC: 13.4 G/DL (ref 11.8–16)
HGB UR QL STRIP: NEGATIVE
IMM GRANULOCYTES # BLD AUTO: 0.01 X10(3)/MCL (ref 0–0.03)
IMM GRANULOCYTES NFR BLD AUTO: 0.2 % (ref 0–0.5)
KETONES UR QL STRIP: NEGATIVE
LDLC SERPL DIRECT ASSAY-SCNC: 114.9 MG/DL (ref 30–100)
LEUKOCYTE ESTERASE UR QL STRIP: NEGATIVE
LYMPHOCYTES # BLD AUTO: 2.74 X10(3)/MCL (ref 1.16–3.74)
LYMPHOCYTES NFR BLD AUTO: 44.3 % (ref 20–55)
MCH RBC QN AUTO: 27.7 PG (ref 27–34)
MCHC RBC AUTO-ENTMCNC: 33.3 G/DL (ref 31–37)
MCV RBC AUTO: 83.1 FL (ref 79–99)
MICROALBUMIN UR-MCNC: 8 UG/ML
MONOCYTES # BLD AUTO: 0.56 X10(3)/MCL (ref 0.24–0.36)
MONOCYTES NFR BLD AUTO: 9.1 % (ref 4.7–12.5)
NEUTROPHILS # BLD AUTO: 2.6 X10(3)/MCL (ref 1.56–6.13)
NEUTROPHILS NFR BLD AUTO: 42.1 % (ref 37–73)
NITRITE UR QL STRIP: NEGATIVE
NRBC BLD AUTO-RTO: 0 %
PH UR STRIP: 7 [PH]
PHOSPHATE SERPL-MCNC: 4.3 MG/DL (ref 2.5–4.9)
PLATELET # BLD AUTO: 298 X10(3)/MCL (ref 140–371)
PMV BLD AUTO: 9.4 FL (ref 9.4–12.4)
POTASSIUM SERPL-SCNC: 4.3 MMOL/L (ref 3.5–5.1)
PROT SERPL-MCNC: 6.5 GM/DL (ref 6.3–8.2)
PROT UR QL STRIP: ABNORMAL
RBC # BLD AUTO: 4.84 X10(6)/MCL (ref 4–5.1)
SODIUM SERPL-SCNC: 136 MMOL/L (ref 136–145)
SP GR UR STRIP.AUTO: 1.02 (ref 1–1.03)
T4 FREE SERPL-MCNC: 0.81 NG/DL (ref 0.78–2.19)
TRIGL SERPL-MCNC: 85 MG/DL (ref 30–200)
TSH SERPL-ACNC: 2.21 UIU/ML (ref 0.36–3.74)
UROBILINOGEN UR STRIP-ACNC: 1
WBC # BLD AUTO: 6.18 X10(3)/MCL (ref 4–11.5)

## 2024-10-23 PROCEDURE — 82570 ASSAY OF URINE CREATININE: CPT

## 2024-10-23 PROCEDURE — 81003 URINALYSIS AUTO W/O SCOPE: CPT

## 2024-10-23 PROCEDURE — 83036 HEMOGLOBIN GLYCOSYLATED A1C: CPT

## 2024-10-23 PROCEDURE — 80061 LIPID PANEL: CPT

## 2024-10-23 PROCEDURE — 82043 UR ALBUMIN QUANTITATIVE: CPT

## 2024-10-23 PROCEDURE — 80053 COMPREHEN METABOLIC PANEL: CPT

## 2024-10-23 PROCEDURE — 84439 ASSAY OF FREE THYROXINE: CPT

## 2024-10-23 PROCEDURE — 84100 ASSAY OF PHOSPHORUS: CPT

## 2024-10-23 PROCEDURE — 85025 COMPLETE CBC W/AUTO DIFF WBC: CPT

## 2024-10-23 PROCEDURE — 84443 ASSAY THYROID STIM HORMONE: CPT

## 2024-10-23 PROCEDURE — 36415 COLL VENOUS BLD VENIPUNCTURE: CPT

## 2024-12-10 ENCOUNTER — TELEPHONE (OUTPATIENT)
Dept: OBSTETRICS AND GYNECOLOGY | Facility: CLINIC | Age: 49
End: 2024-12-10
Payer: MEDICARE

## 2024-12-10 DIAGNOSIS — B37.9 YEAST INFECTION: Primary | ICD-10-CM

## 2024-12-10 NOTE — TELEPHONE ENCOUNTER
----- Message from Carmita sent at 12/10/2024  3:08 PM CST -----  Regarding: PT Message  Contact: Patient  Patient is experiencing Vaginal Itching/ irritation. She thinks its a Yeast Infection. She is requesting the pill medication due to her physical condition of being confined to a wheel Chair    [facility] [address] [facility] [place] [address]  Phone: [phone] Fax: [phone]

## 2024-12-11 RX ORDER — FLUCONAZOLE 150 MG/1
150 TABLET ORAL DAILY
Qty: 1 TABLET | Refills: 0 | Status: SHIPPED | OUTPATIENT
Start: 2024-12-11 | End: 2024-12-12

## 2024-12-13 DIAGNOSIS — Z79.890 HORMONE REPLACEMENT THERAPY: ICD-10-CM

## 2024-12-13 RX ORDER — ESTRADIOL 2 MG/1
2 TABLET ORAL
Qty: 30 TABLET | Refills: 6 | Status: SHIPPED | OUTPATIENT
Start: 2024-12-13